# Patient Record
(demographics unavailable — no encounter records)

---

## 2025-06-04 NOTE — CONSULT LETTER
[Dear  ___] : Dear  [unfilled], [( Thank you for referring [unfilled] for consultation for _____ )] : Thank you for referring [unfilled] for consultation for [unfilled] [Please see my note below.] : Please see my note below. [Consult Closing:] : Thank you very much for allowing me to participate in the care of this patient.  If you have any questions, please do not hesitate to contact me. [Sincerely,] : Sincerely, [FreeTextEntry3] : Whitney Braun MD

## 2025-06-04 NOTE — ASSESSMENT
[FreeTextEntry1] : 57 yo F with primary hyperparathyroidism.  We had a long discussion about thyroid and parathyroid anatomy, physiology and pathophysiology. We discussed the diagnosis of primary hyperparathyroidism and its manifestations as single and double adenomas as well as 4-gland hyperplasia. We spoke about the utility of localization studies and their role in operative planning in terms of focused parathyroidectomy and 4-gland exploration. We discussed intraoperative PTH and nerve monitoring. We discussed the indications for surgery and the risks of surgery including bleeding, infection, recurrence of disease, injury to surrounding structures. We also discussed postop recovery including pain control, activity, diet and possible need for temporary calcium supplementation. The patient understands all this and would like to proceed. Will plan for parathyroidectomy once localization studies are completed. Will also obtain DEXA for baseline.  Surgery will be under general anesthesia with intraoperative nerve and PTH monitoring at [facility] as an ambulatory procedure. Patient will obtain presurgical testing prior to surgery.

## 2025-06-04 NOTE — PHYSICAL EXAM
[Respiratory Effort] : normal respiratory effort [Normal Rate and Rhythm] : normal rate and rhythm [No Rash or Lesion] : No rash or lesion [Calm] : calm [de-identified] : NAD, well-appearing  [de-identified] : anicteric  [de-identified] : Thyroid gland with several tiny cystic nodules; deep to the right mid/lower pole there is a 1cm hypoechoic lesion separate from the thyroid c/w parathyroid adenoma; speaking in a normal voice [de-identified] : soft nondistended

## 2025-06-04 NOTE — HISTORY OF PRESENT ILLNESS
[de-identified] : 55 yo F referred by Dr. Montgomery for primary hyperparathyroidism. Patient was noted to have mildly elevated Ca for the last couple years. Most recently, she had calcium of 11.3 with PTH 67. She has not had a bone density or other imaging. She denies kidney stones or fractures. She has no significant PMH. She reports some reflux symptoms and fatigue but otherwise no other symptoms.